# Patient Record
Sex: FEMALE | Race: WHITE | NOT HISPANIC OR LATINO | URBAN - METROPOLITAN AREA
[De-identification: names, ages, dates, MRNs, and addresses within clinical notes are randomized per-mention and may not be internally consistent; named-entity substitution may affect disease eponyms.]

---

## 2023-10-06 PROBLEM — M25.561 BILATERAL KNEE PAIN: Status: ACTIVE | Noted: 2023-10-06

## 2023-10-06 PROBLEM — M17.0 ARTHRITIS OF BOTH KNEES: Status: ACTIVE | Noted: 2023-10-06

## 2023-10-06 PROBLEM — M25.562 BILATERAL KNEE PAIN: Status: ACTIVE | Noted: 2023-10-06

## 2023-10-06 RX ORDER — METHYLPREDNISOLONE 4 MG/1
TABLET ORAL
COMMUNITY
Start: 2021-04-13

## 2023-10-06 RX ORDER — LISINOPRIL 5 MG/1
1 TABLET ORAL DAILY
COMMUNITY
Start: 2021-03-27

## 2023-10-06 RX ORDER — ATORVASTATIN CALCIUM 40 MG/1
40 TABLET, FILM COATED ORAL
COMMUNITY
Start: 2021-04-02

## 2023-10-06 RX ORDER — TOPIRAMATE 25 MG/1
25 TABLET ORAL
COMMUNITY
Start: 2021-08-04

## 2023-10-06 RX ORDER — FUROSEMIDE 20 MG/1
20 TABLET ORAL
COMMUNITY
Start: 2021-04-13

## 2023-10-06 RX ORDER — MOMETASONE FUROATE 1 MG/G
OINTMENT TOPICAL
COMMUNITY
Start: 2021-08-03

## 2023-10-06 RX ORDER — TRAZODONE HYDROCHLORIDE 50 MG/1
1 TABLET ORAL NIGHTLY
COMMUNITY
Start: 2021-03-27

## 2023-10-06 RX ORDER — DICYCLOMINE HYDROCHLORIDE 20 MG/1
20 TABLET ORAL
COMMUNITY
Start: 2021-04-02

## 2023-10-06 RX ORDER — CLARITHROMYCIN 500 MG/1
500 TABLET, FILM COATED ORAL
COMMUNITY
Start: 2021-04-22

## 2023-10-06 RX ORDER — OMEPRAZOLE 40 MG/1
40 CAPSULE, DELAYED RELEASE ORAL
COMMUNITY
Start: 2021-04-01

## 2023-10-06 RX ORDER — MIRABEGRON 50 MG/1
50 TABLET, EXTENDED RELEASE ORAL
COMMUNITY
Start: 2021-04-29

## 2023-10-06 RX ORDER — CLONAZEPAM 1 MG/1
1 TABLET ORAL
COMMUNITY
Start: 2021-04-09

## 2023-10-06 RX ORDER — LAMOTRIGINE 100 MG/1
100 TABLET ORAL
COMMUNITY
Start: 2021-04-02

## 2023-10-06 RX ORDER — ONDANSETRON 4 MG/1
4 TABLET, ORALLY DISINTEGRATING ORAL 2 TIMES DAILY PRN
COMMUNITY
Start: 2021-03-27

## 2023-10-06 RX ORDER — OXYBUTYNIN CHLORIDE 10 MG/1
10 TABLET, EXTENDED RELEASE ORAL
COMMUNITY
Start: 2021-05-27

## 2023-10-06 RX ORDER — CYCLOBENZAPRINE HCL 10 MG
1 TABLET ORAL 3 TIMES DAILY
COMMUNITY
Start: 2021-03-27

## 2023-10-06 RX ORDER — BLOOD SUGAR DIAGNOSTIC
STRIP MISCELLANEOUS
COMMUNITY
Start: 2021-03-27

## 2023-10-06 RX ORDER — METFORMIN HYDROCHLORIDE 500 MG/1
500 TABLET, EXTENDED RELEASE ORAL
COMMUNITY
Start: 2021-03-27

## 2023-10-06 RX ORDER — FLUTICASONE PROPIONATE AND SALMETEROL 250; 50 UG/1; UG/1
POWDER RESPIRATORY (INHALATION)
COMMUNITY
Start: 2021-04-29

## 2023-10-12 DIAGNOSIS — M25.562 ACUTE PAIN OF BOTH KNEES: ICD-10-CM

## 2023-10-12 DIAGNOSIS — M25.561 ACUTE PAIN OF BOTH KNEES: ICD-10-CM

## 2023-10-13 ENCOUNTER — OFFICE VISIT (OUTPATIENT)
Dept: ORTHOPEDIC SURGERY | Facility: CLINIC | Age: 53
End: 2023-10-13
Payer: COMMERCIAL

## 2023-10-13 ENCOUNTER — ANCILLARY PROCEDURE (OUTPATIENT)
Dept: RADIOLOGY | Facility: CLINIC | Age: 53
End: 2023-10-13
Payer: COMMERCIAL

## 2023-10-13 VITALS — HEIGHT: 69 IN | WEIGHT: 293 LBS | BODY MASS INDEX: 43.4 KG/M2

## 2023-10-13 DIAGNOSIS — G89.29 CHRONIC PAIN OF BOTH KNEES: ICD-10-CM

## 2023-10-13 DIAGNOSIS — M17.0 ARTHRITIS OF BOTH KNEES: Primary | ICD-10-CM

## 2023-10-13 DIAGNOSIS — M25.561 ACUTE PAIN OF BOTH KNEES: ICD-10-CM

## 2023-10-13 DIAGNOSIS — M25.562 CHRONIC PAIN OF BOTH KNEES: ICD-10-CM

## 2023-10-13 DIAGNOSIS — M25.561 CHRONIC PAIN OF BOTH KNEES: ICD-10-CM

## 2023-10-13 DIAGNOSIS — M25.562 ACUTE PAIN OF BOTH KNEES: ICD-10-CM

## 2023-10-13 PROCEDURE — 73562 X-RAY EXAM OF KNEE 3: CPT | Mod: 50,FY

## 2023-10-13 PROCEDURE — 20610 DRAIN/INJ JOINT/BURSA W/O US: CPT

## 2023-10-13 PROCEDURE — 1036F TOBACCO NON-USER: CPT

## 2023-10-13 PROCEDURE — 99213 OFFICE O/P EST LOW 20 MIN: CPT

## 2023-10-13 PROCEDURE — 73562 X-RAY EXAM OF KNEE 3: CPT | Mod: BILATERAL PROCEDURE | Performed by: RADIOLOGY

## 2023-10-13 RX ORDER — LIDOCAINE HYDROCHLORIDE 10 MG/ML
2 INJECTION INFILTRATION; PERINEURAL
Status: COMPLETED | OUTPATIENT
Start: 2023-10-13 | End: 2023-10-13

## 2023-10-13 RX ORDER — TRIAMCINOLONE ACETONIDE 40 MG/ML
40 INJECTION, SUSPENSION INTRA-ARTICULAR; INTRAMUSCULAR
Status: COMPLETED | OUTPATIENT
Start: 2023-10-13 | End: 2023-10-13

## 2023-10-13 RX ADMIN — LIDOCAINE HYDROCHLORIDE 2 ML: 10 INJECTION INFILTRATION; PERINEURAL at 10:12

## 2023-10-13 RX ADMIN — TRIAMCINOLONE ACETONIDE 40 MG: 40 INJECTION, SUSPENSION INTRA-ARTICULAR; INTRAMUSCULAR at 10:12

## 2023-10-13 NOTE — PROGRESS NOTES
Subjective    Patient ID: Luzma Leal is a 53 y.o. female.    Chief Complaint: Pain of the Right Knee (pain) and Pain of the Left Knee     HPI  This is a pleasant 53-year-old female presenting to the office for follow-up of bilateral knee pain.  Left knee pain is worse than the right.  She has a history of bilateral knee osteoarthritis.  I last saw patient in July 2023, when she received bilateral intra-articular steroid injections of Kenalog/lidocaine.  States that injections were of benefit up until the last few weeks.  There has been no new injury since last visit.  She does intermittently notice swelling bilaterally.  She points to the medial aspect of both knees when describing the pain.  Pain is described as a constant dull ache.  Pain hinders her activities of daily living including prolonged standing walking and stair climbing.  She has had gel injections in the past, which she states were not of significant benefit.  She continues to use Tylenol, as well as heat and ice application with minimal relief of symptoms.  She cannot take anti-inflammatories, as she has had bariatric surgery in the past.  Denies numbness and paresthesia of bilateral lower extremities.  She has an upcoming wedding this weekend and desires bilateral knee injections.    The patient's past medical, surgical, family, and social history as well as allergies and medications were reviewed and updated in the chart.    Objective   Ortho Exam  Pleasant and no acute distress. Walks with an antalgic gait. Bilateral knees appearing without soft tissue swelling erythema or ecchymosis.  There is no warmth upon touch.  Patellofemoral crepitus noted with range of motion testing bilaterally.  Right knee range of motion is 3-115°. There is a mild effusion. The knee is stable to varus and valgus stress Lachman and posterior drawer. There is generalized tenderness. Left knee range of motion is 3-110°. There is a mild effusion. The knee is stable to  varus and valgus stress Lachman and posterior drawer. There is generalized tenderness, but most notable to the medial joint line.. Both lower extremities are well perfused the skin is intact and muscle tone is adequate.    Image Results:  Multiple view x-rays of bilateral knees obtained today independently reviewed, without evidence of acute fracture or dislocation.  There is degenerative changes of bilateral knees noted, joint space narrowing of medial and patellofemoral compartments.  There is peripheral osteophyte formation.    L Inj/Asp: bilateral knee on 10/13/2023 10:12 AM  Indications: pain  Details: 22 G needle, superolateral approach  Medications (Right): 40 mg triamcinolone acetonide 40 mg/mL; 2 mL lidocaine 10 mg/mL (1 %)  Medications (Left): 40 mg triamcinolone acetonide 40 mg/mL; 2 mL lidocaine 10 mg/mL (1 %)  Procedure, treatment alternatives, risks and benefits explained, specific risks discussed. Consent was given by the patient.        Assessment/Plan   Encounter Diagnoses: Bilateral knee arthritis    Plan: Discussion with patient about conservative and surgical treatment options in regards to bilateral knee arthritis.  Patient would like to continue with conservative treatment options.  As patient desired, a left and right knee intra-articular steroid injection of Kenalog/lidocaine were given and tolerated well.  She can get these injections every 3 months if needed.  She will continue attempts at weight loss.  She will continue with a home exercise program.  She can take Tylenol and use heat and ice application as needed to help decrease pain inflammation.  She should avoid aggravating activities.  She can follow-up as symptoms dictate.

## 2023-10-16 ENCOUNTER — APPOINTMENT (OUTPATIENT)
Dept: ORTHOPEDIC SURGERY | Facility: CLINIC | Age: 53
End: 2023-10-16
Payer: COMMERCIAL